# Patient Record
Sex: MALE | Race: BLACK OR AFRICAN AMERICAN
[De-identification: names, ages, dates, MRNs, and addresses within clinical notes are randomized per-mention and may not be internally consistent; named-entity substitution may affect disease eponyms.]

---

## 2018-02-21 ENCOUNTER — HOSPITAL ENCOUNTER (OUTPATIENT)
Dept: HOSPITAL 88 - OR | Age: 68
Discharge: HOME | End: 2018-02-21
Attending: SPECIALIST
Payer: MEDICARE

## 2018-02-21 DIAGNOSIS — N32.89: ICD-10-CM

## 2018-02-21 DIAGNOSIS — G47.33: ICD-10-CM

## 2018-02-21 DIAGNOSIS — C61: Primary | ICD-10-CM

## 2018-02-21 DIAGNOSIS — K57.90: ICD-10-CM

## 2018-02-21 DIAGNOSIS — J45.909: ICD-10-CM

## 2018-02-21 DIAGNOSIS — E11.9: ICD-10-CM

## 2018-02-21 DIAGNOSIS — N13.8: ICD-10-CM

## 2018-02-21 DIAGNOSIS — N40.1: ICD-10-CM

## 2018-02-21 DIAGNOSIS — I10: ICD-10-CM

## 2018-02-21 PROCEDURE — 55700: CPT

## 2018-02-21 PROCEDURE — 76942 ECHO GUIDE FOR BIOPSY: CPT

## 2018-02-21 PROCEDURE — 76872 US TRANSRECTAL: CPT

## 2018-02-21 PROCEDURE — 36415 COLL VENOUS BLD VENIPUNCTURE: CPT

## 2018-02-21 PROCEDURE — 82948 REAGENT STRIP/BLOOD GLUCOSE: CPT

## 2018-02-21 PROCEDURE — 88305 TISSUE EXAM BY PATHOLOGIST: CPT

## 2018-02-21 NOTE — OPERATIVE REPORT
DATE OF PROCEDURE:  February 21, 2018 

 

PREOPERATIVE DIAGNOSES

1. Prostatic obstruction. 

2. Rule out carcinoma of the prostate.

3. Abnormal prostate-specific antigen.



POSTOPERATIVE DIAGNOSES

1. Prostatic obstruction. 

2. Rule out carcinoma of the prostate.

3. Abnormal prostate-specific antigen.



OPERATION

1. Transrectal ultrasound of the prostate.

2. Transrectal ultrasound-guided needle biopsies.

3. Cystourethroscopy.



ANESTHETIST:  Dr. Dowell



ANESTHETIC:  General.



INDICATIONS:  Mr. Schwab is a 67-year-old male who presented with a chief 

complaint of lower urinary tract obstructive symptoms and elevated PSA.  

Rectal exam showed an enlarged prostate gland with hard, irregular areas at 

the prostatic base.  His PSA was 4.6. 



DESCRIPTION OF PROCEDURE:  This patient was placed on the table in the 

lithotomy position.  Transrectal ultrasound of the prostate was performed.  

The prostate gland measured 4.51 x 3.94 x 4.56 with a total volume of 43.27 

mL.  There were several hypoechoic areas especially at the left base.



Transrectal ultrasound-guided biopsies were obtained and multiple biopsies 

from the abnormal hypoechoic area and from the normal-appearing prostate to 

map it for any multifocal carcinoma.  



This patient was then prepped and draped in a sterile manner.



A #23-Swiss cystoscope was used, and cystourethroscopy was performed.  It 

was noted that the urethra was normal.  



The prostatic urethra was about 3.5 to 4 cm long, bilobar and occlusive. 

There were a lot of abnormal, fluffy tissues on the surface of the prostate 

on both lobes.



Cystoscopy was then performed using both right-angle and foroblique lens.  

It was noted that the bladder mucosa was normal. The bladder wall was 

moderately to markedly trabeculated.  Both ureteral orifices were seen and 

were in normal position, configuration, and efflux.  



The bladder was drained and the cystoscope removed.  The patient was taken 

to the recovery room in satisfactory condition after tolerating the 

procedure well.



Plan for this patient is to be placed on Ceftin 500 mg 1 twice a day for 1 

week, Ultracet tablet 1 every 6 hours p.r.n. and was given 20.  He is to 

return to the office in 1 week.  At that time, results of the biopsy will 

be back.

  





DD:  02/21/2018 11:41

DT:  02/21/2018 12:54

Job#:  [id number]

## 2018-02-21 NOTE — XMS REPORT
Clinical Summary

 Created on: 2018



Sejal Peraza

External Reference #: [id number]

: 1950

Sex: Male



Demographics







 Address  [address]

[place]  [address]

 

 Home Phone  [phone]

 

 Preferred Language  English

 

 Marital Status  

 

 Yazdanism Affiliation  Unknown

 

 Race  Black or 

 

 Ethnic Group  Non-





Author







 Author  [facility]

 

 Organization  [facility]

 

 Address  Unknown

 

 Phone  Unavailable







Support







 Name  Relationship  Address  Phone

 

 Delia Peraza  [facility]  [address] [place]  [address]  [phone]







Care Team Providers







 Care Team Member Name  Role  Phone

 

 Dancel, L. MD  PCP  [phone]







Allergies







    



  Active Allergy   Reactions   Severity   Noted Date   Comments

 

    



  Ciprofloxacin     2017 

 

    



  Iodine     2017 

 

    



  Shellfish Derived     2017 







Current Medications







      



  Prescription   Sig.   Disp.   Refills   Start   End Date   Status



      Date  

 

      



  hydrOXYzine (ATARAX) 25   Take 25 mg by mouth once       Active



  MG tablet   a week.     

 

      



  lisinopril   Take 20 mg by mouth 2       Active



  (PRINIVIL,ZESTRIL) 20 mg   (two) times a day.     



  tablet      

 

      



  losartan (COZAAR) 50 MG   Take 50 mg by mouth       Active



  tablet   daily.     

 

      



  simvastatin (ZOCOR) 40 MG   Take 40 mg by mouth       Active



  tablet   nightly.     

 

      



  amLODIPine (NORVASC) 5 mg   Take 5 mg by mouth daily.       Active



  tablet      

 

      



  aspirin (ECOTRIN) 81 MG   Take 81 mg by mouth       Active



  enteric coated tablet   daily.     

 

      



  cyanocobalamin 500 MCG   Take 500 mcg by mouth       Active



  tablet   daily.     

 

      



  FOLIC ACID/MV,IRON,MIN   Take 1 capsule by mouth       Active



  (CENTRUM ORAL)   once.     

 

      



  linagliptin-metformin   Take 1 capsule by mouth 2       Active



  (JENTADUETO XR) 2.5-1,000   (two) times a day.     



  mg tablet, IR & ER,      



  biphasic 24hr      

 

      



  tamsulosin (FLOMAX) 0.4   Take 0.4 mg by mouth       Active



  mg capsule,extended   daily.     



  release 24hr      







Active Problems







 



  Problem   Noted Date

 

 



  Combined form of senile cataract of both eyes   2017

 

 



  Last Assessment & Plan:



  Mild. Early. Follow.



  MRx updated.

 

 



  Diabetes mellitus without complication   2017

 

 



  Last Assessment & Plan:



  No signs of DR. Annual exams recommended.



  Letter to Dr. Dancel Jr.







Encounters







    



  Date   Type   Specialty   Care Team   Description

 

    



  2018   Hospital   Radiology   Peter Edwards,   Enlarged 
prostate with



   Encounter    MD   urinary obstruction

 

    



  2018   Transcribe   Access   Peter Edwards,   Enlarged 
prostate with



   Orders    MD   urinary obstruction



      (Primary Dx)

 

    



  2017   Office Visit   Ophthalmology   Nicanor Rodriges MD   Diabetes 
mellitus without



      complication (Primary



      Dx);



      Combined form of senile



      cataract of both eyes



after 2017



Family History







   



  Medical History   Relation   Name   Comments

 

   



  Diabetes   Brother  









   



  Relation   Name   Status   Comments

 

   



  Brother   







Social History







    



  Tobacco Use   Types   Packs/Day   Years Used   Date

 

    



  Never Smoker    









   



  Alcohol Use   Drinks/Week   oz/Week   Comments

 

   



  Yes     OCCASIONAL









 



  Sex Assigned at Birth   Date Recorded

 

 



  Not on file 







Last Filed Vital Signs

Not on file



Plan of Treatment







   



  Health Maintenance   Due Date   Last Done   Comments

 

   



  FOOT EXAM   1962  

 

   



  URINE MICROALBUMIN   1962  

 

   



  COLONOSCOPY   2002  

 

   



  ZOSTER VACCINE   2012  

 

   



  PNEUMOCOCCAL   2017  



  POLYSACCHARIDE VACCINE   



  AGE 65 AND OVER   

 

   



  PNEUMOCOCCAL-13   2017  

 

   



  INFLUENZA VACCINE   2017  

 

   



  OPHTHALMOLOGY EXAM   2018 







Results

* ECG 12 lead (2018  2:12 PM)





  



  Component   Value   Ref Range

 

  



  Ventricular rate   73 

 

  



  Atrial rate   73 

 

  



  DC interval   152 

 

  



  QRSD interval   76 

 

  



  QT interval   386 

 

  



  QTC interval   425 

 

  



  P axis 1   52 

 

  



  QRS axis 1   39 

 

  



  T wave axis   6 

 

  



  EKG impression   Normal sinus rhythm with sinus arrhythmia-Normal 



   ECG-No previous ECGs available-Electronically 



   Signed By Tomas Liu MD ([id number]) on 2018 



   12:54:00 PM 









 



  Specimen   Performing Laboratory

 

 



   [facility]



   [address] [place] [address]





* US Prostate (2018  1:39 PM)





 



  Specimen   Performing Laboratory

 

 



    [facility]



   [address] [place] [address]









 Narrative

 

 



PROCEDURE:US PROSTATE



 



CLINICAL HISTORY:N40.1 Benign prostatic hyperplasia with lower urinary 
tract symptoms, N13.8 Other obstructive and reflux uropathy, N40.1



 



COMPARISON:None.



 



TECHNIQUE:



A transrectal examination of the prostate gland was performed in transverse and 
sagittal views with color-flow Doppler interrogation. The seminal vesicles were 
also interrogated.



 



FINDINGS: 



The prostate gland measures 5.4 cm x 4.6 cm x 5.3 cm.



 



The peripheral zone is homogeneous. No focal lesions are identified.



 



Heterogeneity in echotexture is demonstrated of the central zone with 
calcification of the prostate at the junction of the central and peripheral 
zone.



 



No gross abnormality of the seminal vesicles is seen.



 



IMPRESSION: 



 



Abnormal study.



 



Benign prostatic hyperplasia.



 



No focal lesions are identified in the peripheral zone.



 



No abnormality of the seminal vesicles is seen.



 



[id number]



 



.



 









 Procedure Note

 

 



 Interface, Radiology Results Incoming - 2018  4:28 PM CST



PROCEDURE:  US PROSTATE



CLINICAL HISTORY:  N40.1 Benign prostatic hyperplasia with lower urinary tract 
symptoms, N13.8 Other obstructive and reflux uropathy, N40.1



COMPARISON:  None.



TECHNIQUE:  

A transrectal examination of the prostate gland was performed in transverse and 
sagittal views with color-flow Doppler interrogation. The seminal vesicles were 
also interrogated.



FINDINGS: 

The prostate gland measures 5.4 cm x 4.6 cm x 5.3 cm.



The peripheral zone is homogeneous. No focal lesions are identified.



Heterogeneity in echotexture is demonstrated of the central zone with 
calcification of the prostate at the junction of the central and peripheral 
zone.



No gross abnormality of the seminal vesicles is seen.



IMPRESSION: 



Abnormal study.



Benign prostatic hyperplasia.



No focal lesions are identified in the peripheral zone.



No abnormality of the seminal vesicles is seen.



[id number]



 .







after 2017



Insurance







     



  Payer   Benefit   Subscriber ID   Type   Phone   Address



   Plan /    



   Group    

 

     



  [facility] MEDICARE   [employer]   xxxxxxxxx   HMO  



   LAMBERTO JOSHUA    









     



  Guarantor Name   Account   Relation to   Date of   Phone   Billing Address



   Type   Patient   Birth  

 

     



  SEJAL PERAZA   Personal/F   Self   1950   Home:   [facility] [address]



   [facility]     [phone]   [place] [address]